# Patient Record
Sex: FEMALE | Race: BLACK OR AFRICAN AMERICAN | Employment: OTHER | URBAN - METROPOLITAN AREA
[De-identification: names, ages, dates, MRNs, and addresses within clinical notes are randomized per-mention and may not be internally consistent; named-entity substitution may affect disease eponyms.]

---

## 2024-06-17 ENCOUNTER — HOSPITAL ENCOUNTER (EMERGENCY)
Age: 39
Discharge: HOME | End: 2024-06-17
Attending: EMERGENCY MEDICINE

## 2024-06-17 VITALS
OXYGEN SATURATION: 100 % | BODY MASS INDEX: 43.4 KG/M2 | WEIGHT: 293 LBS | HEART RATE: 83 BPM | TEMPERATURE: 99.3 F | HEIGHT: 69 IN | RESPIRATION RATE: 16 BRPM | SYSTOLIC BLOOD PRESSURE: 157 MMHG | DIASTOLIC BLOOD PRESSURE: 89 MMHG

## 2024-06-17 DIAGNOSIS — H01.134 ECZEMATOUS DERMATITIS OF UPPER EYELIDS OF BOTH EYES: Primary | ICD-10-CM

## 2024-06-17 DIAGNOSIS — H01.131 ECZEMATOUS DERMATITIS OF UPPER EYELIDS OF BOTH EYES: Primary | ICD-10-CM

## 2024-06-17 PROCEDURE — 99283 EMERGENCY DEPT VISIT LOW MDM: CPT

## 2024-06-17 RX ORDER — BENZOCAINE/MENTHOL 6 MG-10 MG
LOZENGE MUCOUS MEMBRANE
Qty: 30 G | Refills: 1 | Status: SHIPPED | OUTPATIENT
Start: 2024-06-17 | End: 2024-06-24

## 2024-06-17 ASSESSMENT — PAIN - FUNCTIONAL ASSESSMENT
PAIN_FUNCTIONAL_ASSESSMENT: NONE - DENIES PAIN
PAIN_FUNCTIONAL_ASSESSMENT: NONE - DENIES PAIN

## 2024-06-17 NOTE — ED PROVIDER NOTES
EMERGENCY DEPARTMENT ENCOUNTER     [facility] EMERGENCY DEPARTMENT     Pt Name: Nhi Moreno   MRN: [id number]   Birthdate 1985   Date of evaluation: 6/17/2024   Provider: Alexys Saab MD   PCP: No primary care provider on file.   Note Started: 6:28 PM EDT 6/17/24     CHIEF COMPLAINT     Chief Complaint   Patient presents with    Skin Problem     Pt reports has dry patches upper eyelids for approx. 1 month. Using cream d/t thought was ringworm. Sts uses only sunscreen on face, no make up        HISTORY OF PRESENT ILLNESS:  History from : Patient   Limitations to history : None     Nhi Moreno is a 38 y.o. female who presents for eyelid rash.  Patient reports that she noticed the left eyelid rash initially thought it might be ringworm and was using antifungal cream and has since spread to both upper eyelids.  She states it is slightly itchy but not painful.  No trouble with her vision.  No spreading rash.  She does not use any skin products other than some sunscreen from time to time.    Nursing Notes were all reviewed and agreed with or any disagreements were addressed in the HPI.     ROS: Positives and Pertinent negatives as per HPI.    PAST MEDICAL HISTORY     Past medical history:  has no past medical history on file.    Past surgical history:  has no past surgical history on file.      PHYSICAL EXAM:  ED Triage Vitals [06/17/24 0947]   BP Temp Temp Source Pulse Respirations SpO2 Height Weight - Scale   (!) 176/87 99.3 °F (37.4 °C) Oral 83 16 100 % 1.753 m (5' 9\") (!) 141 kg (310 lb 13.6 oz)        Physical Exam   Scaly dry rash on both upper eyelids worse on the left.  Some slight associated edema but with no warmth.  No raised edge.  No excoriations and no papules.          EMERGENCY DEPARTMENT COURSE and DIFFERENTIAL DIAGNOSIS/MDM:     Vitals:    Vitals:    06/17/24 0947 06/17/24 0952   BP: (!) 176/87 (!) 157/89   Pulse: 83    Resp: 16    Temp: 99.3 °F (37.4 °C)    TempSrc: Oral    SpO2: 100%